# Patient Record
Sex: FEMALE | Race: WHITE | Employment: FULL TIME | URBAN - NONMETROPOLITAN AREA
[De-identification: names, ages, dates, MRNs, and addresses within clinical notes are randomized per-mention and may not be internally consistent; named-entity substitution may affect disease eponyms.]

---

## 2019-07-23 ENCOUNTER — HOSPITAL ENCOUNTER (INPATIENT)
Age: 54
LOS: 3 days | Discharge: HOME | DRG: 417 | End: 2019-07-26
Attending: INTERNAL MEDICINE | Admitting: INTERNAL MEDICINE
Payer: COMMERCIAL

## 2019-07-23 DIAGNOSIS — K80.50 CHOLEDOCHOLITHIASIS: Primary | ICD-10-CM

## 2019-07-23 LAB
ANION GAP SERPL CALCULATED.3IONS-SCNC: 15 MEQ/L (ref 8–16)
BUN BLDV-MCNC: 10 MG/DL (ref 7–22)
CALCIUM SERPL-MCNC: 9.4 MG/DL (ref 8.5–10.5)
CHLORIDE BLD-SCNC: 94 MEQ/L (ref 98–111)
CO2: 25 MEQ/L (ref 23–33)
CREAT SERPL-MCNC: 0.3 MG/DL (ref 0.4–1.2)
ERYTHROCYTE [DISTWIDTH] IN BLOOD BY AUTOMATED COUNT: 13.8 % (ref 11.5–14.5)
ERYTHROCYTE [DISTWIDTH] IN BLOOD BY AUTOMATED COUNT: 45.1 FL (ref 35–45)
GFR SERPL CREATININE-BSD FRML MDRD: > 90 ML/MIN/1.73M2
GLUCOSE BLD-MCNC: 110 MG/DL (ref 70–108)
HCT VFR BLD CALC: 37.5 % (ref 37–47)
HEMOGLOBIN: 12.7 GM/DL (ref 12–16)
INR BLD: 1.11 (ref 0.85–1.13)
MCH RBC QN AUTO: 30.4 PG (ref 26–33)
MCHC RBC AUTO-ENTMCNC: 33.9 GM/DL (ref 32.2–35.5)
MCV RBC AUTO: 89.7 FL (ref 81–99)
PLATELET # BLD: 254 THOU/MM3 (ref 130–400)
PMV BLD AUTO: 10.7 FL (ref 9.4–12.4)
POTASSIUM SERPL-SCNC: 4.1 MEQ/L (ref 3.5–5.2)
RBC # BLD: 4.18 MILL/MM3 (ref 4.2–5.4)
SODIUM BLD-SCNC: 134 MEQ/L (ref 135–145)
WBC # BLD: 10.9 THOU/MM3 (ref 4.8–10.8)

## 2019-07-23 PROCEDURE — 80048 BASIC METABOLIC PNL TOTAL CA: CPT

## 2019-07-23 PROCEDURE — 99223 1ST HOSP IP/OBS HIGH 75: CPT | Performed by: INTERNAL MEDICINE

## 2019-07-23 PROCEDURE — 36415 COLL VENOUS BLD VENIPUNCTURE: CPT

## 2019-07-23 PROCEDURE — 2709999900 HC NON-CHARGEABLE SUPPLY

## 2019-07-23 PROCEDURE — 2580000003 HC RX 258: Performed by: INTERNAL MEDICINE

## 2019-07-23 PROCEDURE — C9113 INJ PANTOPRAZOLE SODIUM, VIA: HCPCS | Performed by: INTERNAL MEDICINE

## 2019-07-23 PROCEDURE — 6360000002 HC RX W HCPCS: Performed by: INTERNAL MEDICINE

## 2019-07-23 PROCEDURE — 85027 COMPLETE CBC AUTOMATED: CPT

## 2019-07-23 PROCEDURE — 85610 PROTHROMBIN TIME: CPT

## 2019-07-23 PROCEDURE — 94760 N-INVAS EAR/PLS OXIMETRY 1: CPT

## 2019-07-23 PROCEDURE — 1200000000 HC SEMI PRIVATE

## 2019-07-23 RX ORDER — PANTOPRAZOLE SODIUM 40 MG/10ML
40 INJECTION, POWDER, LYOPHILIZED, FOR SOLUTION INTRAVENOUS DAILY
Status: DISCONTINUED | OUTPATIENT
Start: 2019-07-23 | End: 2019-07-26 | Stop reason: HOSPADM

## 2019-07-23 RX ORDER — ONDANSETRON 2 MG/ML
4 INJECTION INTRAMUSCULAR; INTRAVENOUS EVERY 6 HOURS PRN
Status: DISCONTINUED | OUTPATIENT
Start: 2019-07-23 | End: 2019-07-26 | Stop reason: HOSPADM

## 2019-07-23 RX ORDER — SODIUM CHLORIDE 9 MG/ML
INJECTION, SOLUTION INTRAVENOUS CONTINUOUS
Status: DISCONTINUED | OUTPATIENT
Start: 2019-07-23 | End: 2019-07-26 | Stop reason: HOSPADM

## 2019-07-23 RX ORDER — SODIUM CHLORIDE 0.9 % (FLUSH) 0.9 %
10 SYRINGE (ML) INJECTION EVERY 12 HOURS SCHEDULED
Status: DISCONTINUED | OUTPATIENT
Start: 2019-07-23 | End: 2019-07-26 | Stop reason: HOSPADM

## 2019-07-23 RX ORDER — LEVOTHYROXINE SODIUM 0.12 MG/1
125 TABLET ORAL DAILY
Status: DISCONTINUED | OUTPATIENT
Start: 2019-07-23 | End: 2019-07-26 | Stop reason: HOSPADM

## 2019-07-23 RX ORDER — SODIUM CHLORIDE 0.9 % (FLUSH) 0.9 %
10 SYRINGE (ML) INJECTION PRN
Status: DISCONTINUED | OUTPATIENT
Start: 2019-07-23 | End: 2019-07-26 | Stop reason: HOSPADM

## 2019-07-23 RX ORDER — 0.9 % SODIUM CHLORIDE 0.9 %
10 VIAL (ML) INJECTION PRN
Status: DISCONTINUED | OUTPATIENT
Start: 2019-07-23 | End: 2019-07-23 | Stop reason: SDUPTHER

## 2019-07-23 RX ORDER — SODIUM CHLORIDE 0.9 % (FLUSH) 0.9 %
10 SYRINGE (ML) INJECTION EVERY 12 HOURS SCHEDULED
Status: DISCONTINUED | OUTPATIENT
Start: 2019-07-23 | End: 2019-07-23 | Stop reason: SDUPTHER

## 2019-07-23 RX ORDER — FLUTICASONE PROPIONATE 50 MCG
1 SPRAY, SUSPENSION (ML) NASAL DAILY
Status: DISCONTINUED | OUTPATIENT
Start: 2019-07-23 | End: 2019-07-26 | Stop reason: HOSPADM

## 2019-07-23 RX ORDER — LEVOTHYROXINE SODIUM 0.12 MG/1
125 TABLET ORAL DAILY
COMMUNITY

## 2019-07-23 RX ADMIN — PIPERACILLIN SODIUM,TAZOBACTAM SODIUM 3.38 G: 3; .375 INJECTION, POWDER, FOR SOLUTION INTRAVENOUS at 23:46

## 2019-07-23 RX ADMIN — PANTOPRAZOLE SODIUM 40 MG: 40 INJECTION, POWDER, FOR SOLUTION INTRAVENOUS at 15:47

## 2019-07-23 RX ADMIN — PIPERACILLIN SODIUM,TAZOBACTAM SODIUM 3.38 G: 3; .375 INJECTION, POWDER, FOR SOLUTION INTRAVENOUS at 00:00

## 2019-07-23 RX ADMIN — SODIUM CHLORIDE: 9 INJECTION, SOLUTION INTRAVENOUS at 15:48

## 2019-07-23 ASSESSMENT — PAIN SCALES - GENERAL: PAINLEVEL_OUTOF10: 0

## 2019-07-23 NOTE — CONSULTS
Medication Sig Start Date End Date Taking? Authorizing Provider   levothyroxine (SYNTHROID) 125 MCG tablet Take 125 mcg by mouth Daily   Yes Historical Provider, MD   Esomeprazole Magnesium (NEXIUM 24HR PO) Take 1 tablet by mouth as needed   Yes Historical Provider, MD   Cetirizine HCl (ZYRTEC ALLERGY) 10 MG CAPS Take 1 tablet by mouth daily   Yes Historical Provider, MD   Fluticasone Propionate (FLONASE ALLERGY RELIEF NA) 2 puffs by Nasal route as needed   Yes Historical Provider, MD    Scheduled Meds:   piperacillin-tazobactam  3.375 g Intravenous Q8H    fluticasone  1 spray Nasal Daily    levothyroxine  125 mcg Oral Daily    pantoprazole  40 mg Intravenous Daily    sodium chloride flush  10 mL Intravenous 2 times per day     Continuous Infusions:   sodium chloride 75 mL/hr at 07/24/19 0351     PRN Meds:.sodium chloride flush, magnesium hydroxide, ondansetron  Allergies  is allergic to aspirin and ibuprofen. Family History  family history is not on file. Social History   reports that she has quit smoking. She has never used smokeless tobacco. She reports that she drank alcohol. She reports that she has current or past drug history. Review of Systems:  General Denies any fever or chills  HEENT Denies any diplopia, tinnitus or vertigo  Resp Denies any shortness of breath, cough or wheezing  Cardiac Denies any chest pain, palpitations, claudication or edema  GI Denies any melena, hematochezia, hematemesis or pyrosis   Denies any frequency, urgency, hesitancy or incontinence  Heme Denies bruising or bleeding easily  Endocrine Denies any history of diabetes or thyroid disease  Neuro Denies any focal motor or sensory deficits  OBJECTIVE:   CURRENT VITALS:  height is 5' 8\" (1.727 m) and weight is 158 lb 9.6 oz (71.9 kg). Her oral temperature is 98.8 °F (37.1 °C). Her blood pressure is 125/77 and her pulse is 85. Her respiration is 16 and oxygen saturation is 96%. Body mass index is 24.12 kg/m².   Temperature --   --  2.1   CALCIUM 9.4  --  9.0   INR  --  1.11  --    AST  --   --  72*   ALT  --   --  397*   BILITOT  --   --  2.9*   BILIDIR  --   --  1.5*   LIPASE  --   --  176.1*     RADIOLOGY:   I have personally reviewed the following films:  No orders to display       Thank you for the interesting evaluation. Further recommendations to follow.       Electronically signed by Daisy Escobar DO on 7/24/2019 at 7:42 AM

## 2019-07-23 NOTE — CONSULTS
Consult History & Physical      Patient:  Roxie Sykes  YOB: 1965    MRN: [id number]     Acct: [de-identified]      Chief Complaint:  Suspected choledocholithiasis, pancreatitis    Date of Service: Pt seen/examined in consultation on 7/23/2019    History Of Present Illness:      49 y.o. female who we are asked to see/evaluate by [address]* for medical management of Suspected choledocholithiasis,    [address] is a 52-year-old  female with underlying history of diabetes, hypothyroid and GERD who was transferred from [facility] due to pancreatitis secondary to  choledocholithiasis. She was scheduled for laparoscopic cholecystectomy as outpatient on 7/24/2019 but presented to the hospital on 7/21/2019 with increasing nausea, vomiting and abdominal pain. Admission labs demonstrated WBC 11.9, lipase 45,927, total bilirubin 6.1, direct bilirubin 4.6, alkaline phosphatase 290, , ALT 1406, amylase 2907 MRCP was completed with no visible calculus noted although other findings concerning for possible choledocholithiasis. She was treated with supportive care. Her lipase did trend down from 2800. She was treated with Invanz Surgery was consulted plan was for cholecystectomy on 7/24/2019. However, today total bilirubin increased to 7. 3.and direct bilirubin to 5.5, Other liver labs were trending down alkaline phosphatase 365, , , lipase 2796, direct bilirubin 5.5  Dr. Marquita Saldana recommended transferring patient to [facility] for ERCP prior to cholecystectomy. 7/21/2019 MRI/MRCP of the abdomen demonstrated marked hepatic steatosis. Moderate intra-and extrahepatic biliary ductal dilation. New from prior CT scan on 7/15/2019. Common duct now measures up to 1.5 cm. Cholelithiasis. Pancreatic duct is normal in caliber. Incidentally noted ansa pancreatica. New inflammatory changes surrounding the pancreas.   No visible choledocholithiasis, though punctuate calculus impacted at the ampulla could cause this appearance. 7/21/2019 right upper quadrant ultrasound demonstrates cholelithiasis with no sonographic evidence for acute cholecystitis. Dilated common bile duct measuring 1.3 cm is noted on comparison CT. Distal obstructive process cannot be excluded. Hepatic steatosis. On presentation today patient states abdominal pain has resolved. She denies any nausea or vomiting but admits she has not eaten in several days. She is passing gas and last bowel movement was a couple days ago. Patient denies any history of alcohol use or illicit drug use. She is never had pancreatitis before. No family history of pancreatic cancer. Patient has never had a colonoscopy. She denies any family history of colon cancer. She has underlying GERD but this is been well controlled of late. She denies dysphagia, odynophagia. Past Medical History:        Diagnosis Date    GERD (gastroesophageal reflux disease)     Hypothyroidism        Past Surgical History:        Procedure Laterality Date    CARPAL TUNNEL RELEASE Bilateral     SINUS SURGERY      x2    TUBAL LIGATION         Medications Prior to Admission:    Prior to Admission medications    Medication Sig Start Date End Date Taking? Authorizing Provider   levothyroxine (SYNTHROID) 125 MCG tablet Take 125 mcg by mouth Daily   Yes Historical Provider, MD   Esomeprazole Magnesium (NEXIUM 24HR PO) Take 1 tablet by mouth as needed   Yes Historical Provider, MD   Cetirizine HCl (ZYRTEC ALLERGY) 10 MG CAPS Take 1 tablet by mouth daily   Yes Historical Provider, MD   Fluticasone Propionate (FLONASE ALLERGY RELIEF NA) 2 puffs by Nasal route as needed   Yes Historical Provider, MD       Allergies:  Aspirin and Ibuprofen    Social History:      The patient currently lives Home    TOBACCO:   reports that she has quit smoking.  She has never used smokeless tobacco.  ETOH:   reports that she drank alcohol. Family History:      Reviewed in detail and negative for DM, CAD, Cancer, CVA. Positive as follows:    History reviewed. No pertinent family history. Diet:  Diet NPO Effective Now      Review Of Systems    GENERAL:  No fever, chills or weight loss. EYES:  No  blurred vision, double vision, glaucoma, pain on exposure to light. CARDIOVASCULAR:  No chest pain or palpitations. RESPIRATORY:  No dyspnea or cough. GI:  Refer to HPI  MUSCULOSKELETAL:  No new painful or swollen joints or myalgias. :   No dysuria or hematuria. SKIN:  No rashes or jaundice. NEUROLOGIC:   No headaches or  seizures,  numbness or tingling of arms, or legs. PSYCH:  No anxiety or depression. ENDOCRINE:   No polyuria or polydipsia. BLOOD:  No anemia, bleeding disorder, blood or blood product transfusion. PHYSICAL EXAM:  BP (!) 140/88   Pulse 86   Temp 98.9 °F (37.2 °C)   Resp 16   Ht 5' 8\" (1.727 m)   Wt 160 lb (72.6 kg)   BMI 24.33 kg/m²   General appearance: No apparent distress, appears stated age and cooperative. HEENT: Normal cephalic, atraumatic without obvious deformity. Pupils equal, round, and reactive to light. Sclera icteric   Neck: Supple, with full range of motion. No jugular venous distention. Trachea midline. Respiratory:  Normal respiratory effort. Clear to auscultation, bilaterally without Rales/Wheezes/Rhonchi. Cardiovascular: Regular rate and rhythm with normal S1/S2 without murmurs, rubs or gallops. Abdomen: Soft, non-tender, non-distended with normal bowel sounds. Musculoskeletal: No clubbing, cyanosis or edema bilaterally. Skin: Jaundice, texture, turgor normal.  No rashes or lesions. Neurologic:  Neurovascularly intact without any focal sensory/motor deficits.   Psychiatric: Alert and oriented, thought content appropriate, normal insight  Capillary Refill: Brisk,< 3 seconds   Peripheral Pulses: +2 palpable, equal bilaterally     Labs:     Recent Labs

## 2019-07-24 ENCOUNTER — APPOINTMENT (OUTPATIENT)
Dept: GENERAL RADIOLOGY | Age: 54
DRG: 417 | End: 2019-07-24
Attending: INTERNAL MEDICINE
Payer: COMMERCIAL

## 2019-07-24 LAB
ALBUMIN SERPL-MCNC: 3.4 G/DL (ref 3.5–5.1)
ALP BLD-CCNC: 290 U/L (ref 38–126)
ALT SERPL-CCNC: 397 U/L (ref 11–66)
ANION GAP SERPL CALCULATED.3IONS-SCNC: 15 MEQ/L (ref 8–16)
AST SERPL-CCNC: 72 U/L (ref 5–40)
BILIRUB SERPL-MCNC: 2.9 MG/DL (ref 0.3–1.2)
BILIRUBIN DIRECT: 1.5 MG/DL (ref 0–0.3)
BUN BLDV-MCNC: 10 MG/DL (ref 7–22)
CALCIUM SERPL-MCNC: 9 MG/DL (ref 8.5–10.5)
CHLORIDE BLD-SCNC: 99 MEQ/L (ref 98–111)
CO2: 22 MEQ/L (ref 23–33)
CREAT SERPL-MCNC: 0.4 MG/DL (ref 0.4–1.2)
GFR SERPL CREATININE-BSD FRML MDRD: > 90 ML/MIN/1.73M2
GLUCOSE BLD-MCNC: 113 MG/DL (ref 70–108)
LIPASE: 176.1 U/L (ref 5.6–51.3)
MAGNESIUM: 2.1 MG/DL (ref 1.6–2.4)
POTASSIUM REFLEX MAGNESIUM: 3.5 MEQ/L (ref 3.5–5.2)
SODIUM BLD-SCNC: 136 MEQ/L (ref 135–145)
TOTAL PROTEIN: 6.7 G/DL (ref 6.1–8)

## 2019-07-24 PROCEDURE — 2580000003 HC RX 258: Performed by: INTERNAL MEDICINE

## 2019-07-24 PROCEDURE — C1769 GUIDE WIRE: HCPCS | Performed by: INTERNAL MEDICINE

## 2019-07-24 PROCEDURE — 80076 HEPATIC FUNCTION PANEL: CPT

## 2019-07-24 PROCEDURE — 3609014900 HC ERCP W/SPHINCTEROTOMY &/OR PAPILLOTOMY: Performed by: INTERNAL MEDICINE

## 2019-07-24 PROCEDURE — 99153 MOD SED SAME PHYS/QHP EA: CPT | Performed by: INTERNAL MEDICINE

## 2019-07-24 PROCEDURE — 99152 MOD SED SAME PHYS/QHP 5/>YRS: CPT | Performed by: INTERNAL MEDICINE

## 2019-07-24 PROCEDURE — 83735 ASSAY OF MAGNESIUM: CPT

## 2019-07-24 PROCEDURE — 74328 X-RAY BILE DUCT ENDOSCOPY: CPT

## 2019-07-24 PROCEDURE — 1200000000 HC SEMI PRIVATE

## 2019-07-24 PROCEDURE — 6360000002 HC RX W HCPCS: Performed by: INTERNAL MEDICINE

## 2019-07-24 PROCEDURE — 0F778ZZ DILATION OF COMMON HEPATIC DUCT, VIA NATURAL OR ARTIFICIAL OPENING ENDOSCOPIC: ICD-10-PCS | Performed by: INTERNAL MEDICINE

## 2019-07-24 PROCEDURE — BF131ZZ FLUOROSCOPY OF GALLBLADDER AND BILE DUCTS USING LOW OSMOLAR CONTRAST: ICD-10-PCS | Performed by: INTERNAL MEDICINE

## 2019-07-24 PROCEDURE — 2580000003 HC RX 258: Performed by: NURSE PRACTITIONER

## 2019-07-24 PROCEDURE — 0F9C8ZZ DRAINAGE OF AMPULLA OF VATER, VIA NATURAL OR ARTIFICIAL OPENING ENDOSCOPIC: ICD-10-PCS | Performed by: INTERNAL MEDICINE

## 2019-07-24 PROCEDURE — 6370000000 HC RX 637 (ALT 250 FOR IP): Performed by: INTERNAL MEDICINE

## 2019-07-24 PROCEDURE — 83690 ASSAY OF LIPASE: CPT

## 2019-07-24 PROCEDURE — 80048 BASIC METABOLIC PNL TOTAL CA: CPT

## 2019-07-24 PROCEDURE — 99232 SBSQ HOSP IP/OBS MODERATE 35: CPT | Performed by: INTERNAL MEDICINE

## 2019-07-24 PROCEDURE — C9113 INJ PANTOPRAZOLE SODIUM, VIA: HCPCS | Performed by: INTERNAL MEDICINE

## 2019-07-24 PROCEDURE — 2720000010 HC SURG SUPPLY STERILE: Performed by: INTERNAL MEDICINE

## 2019-07-24 PROCEDURE — 99222 1ST HOSP IP/OBS MODERATE 55: CPT | Performed by: SURGERY

## 2019-07-24 PROCEDURE — 36415 COLL VENOUS BLD VENIPUNCTURE: CPT

## 2019-07-24 PROCEDURE — 2709999900 HC NON-CHARGEABLE SUPPLY: Performed by: INTERNAL MEDICINE

## 2019-07-24 PROCEDURE — 2500000003 HC RX 250 WO HCPCS

## 2019-07-24 RX ORDER — MIDAZOLAM HYDROCHLORIDE 1 MG/ML
INJECTION INTRAMUSCULAR; INTRAVENOUS PRN
Status: DISCONTINUED | OUTPATIENT
Start: 2019-07-24 | End: 2019-07-24 | Stop reason: ALTCHOICE

## 2019-07-24 RX ORDER — FENTANYL CITRATE 50 UG/ML
INJECTION, SOLUTION INTRAMUSCULAR; INTRAVENOUS PRN
Status: DISCONTINUED | OUTPATIENT
Start: 2019-07-24 | End: 2019-07-24 | Stop reason: ALTCHOICE

## 2019-07-24 RX ADMIN — Medication 10 ML: at 08:11

## 2019-07-24 RX ADMIN — PIPERACILLIN SODIUM,TAZOBACTAM SODIUM 3.38 G: 3; .375 INJECTION, POWDER, FOR SOLUTION INTRAVENOUS at 17:37

## 2019-07-24 RX ADMIN — SODIUM CHLORIDE: 9 INJECTION, SOLUTION INTRAVENOUS at 22:58

## 2019-07-24 RX ADMIN — PIPERACILLIN SODIUM,TAZOBACTAM SODIUM 3.38 G: 3; .375 INJECTION, POWDER, FOR SOLUTION INTRAVENOUS at 08:16

## 2019-07-24 RX ADMIN — PIPERACILLIN SODIUM,TAZOBACTAM SODIUM 3.38 G: 3; .375 INJECTION, POWDER, FOR SOLUTION INTRAVENOUS at 23:17

## 2019-07-24 RX ADMIN — PANTOPRAZOLE SODIUM 40 MG: 40 INJECTION, POWDER, FOR SOLUTION INTRAVENOUS at 08:11

## 2019-07-24 RX ADMIN — LEVOTHYROXINE SODIUM 125 MCG: 125 TABLET ORAL at 06:15

## 2019-07-24 RX ADMIN — SODIUM CHLORIDE: 9 INJECTION, SOLUTION INTRAVENOUS at 03:51

## 2019-07-24 ASSESSMENT — PAIN SCALES - GENERAL
PAINLEVEL_OUTOF10: 0
PAINLEVEL_OUTOF10: 2

## 2019-07-24 ASSESSMENT — PAIN DESCRIPTION - ONSET: ONSET: GRADUAL

## 2019-07-24 ASSESSMENT — PAIN DESCRIPTION - DIRECTION: RADIATING_TOWARDS: SIDE

## 2019-07-24 ASSESSMENT — PAIN DESCRIPTION - FREQUENCY: FREQUENCY: INTERMITTENT

## 2019-07-24 ASSESSMENT — PAIN - FUNCTIONAL ASSESSMENT
PAIN_FUNCTIONAL_ASSESSMENT: 0-10
PAIN_FUNCTIONAL_ASSESSMENT: ACTIVITIES ARE NOT PREVENTED

## 2019-07-24 ASSESSMENT — PAIN DESCRIPTION - LOCATION: LOCATION: ABDOMEN

## 2019-07-24 ASSESSMENT — PAIN DESCRIPTION - PROGRESSION: CLINICAL_PROGRESSION: GRADUALLY IMPROVING

## 2019-07-24 ASSESSMENT — PAIN DESCRIPTION - PAIN TYPE: TYPE: ACUTE PAIN

## 2019-07-24 ASSESSMENT — PAIN DESCRIPTION - ORIENTATION: ORIENTATION: LEFT

## 2019-07-24 ASSESSMENT — PAIN DESCRIPTION - DESCRIPTORS: DESCRIPTORS: ACHING

## 2019-07-24 NOTE — PLAN OF CARE
Nutrition Problem: Inadequate oral intake  Intervention: Food and/or Nutrient Delivery: (diet as per MD)  Nutritional Goals: Patient will receive adequate nutrition within 1-4 days.     Problem: Nutrition  Goal: Optimal nutrition therapy  Outcome: Ongoing

## 2019-07-25 ENCOUNTER — ANESTHESIA EVENT (OUTPATIENT)
Dept: OPERATING ROOM | Age: 54
DRG: 417 | End: 2019-07-25
Payer: COMMERCIAL

## 2019-07-25 ENCOUNTER — ANESTHESIA (OUTPATIENT)
Dept: OPERATING ROOM | Age: 54
DRG: 417 | End: 2019-07-25
Payer: COMMERCIAL

## 2019-07-25 VITALS
RESPIRATION RATE: 7 BRPM | DIASTOLIC BLOOD PRESSURE: 75 MMHG | SYSTOLIC BLOOD PRESSURE: 127 MMHG | OXYGEN SATURATION: 99 %

## 2019-07-25 LAB
ALBUMIN SERPL-MCNC: 3.6 G/DL (ref 3.5–5.1)
ALP BLD-CCNC: 539 U/L (ref 38–126)
ALT SERPL-CCNC: 373 U/L (ref 11–66)
AST SERPL-CCNC: 195 U/L (ref 5–40)
BILIRUB SERPL-MCNC: 4.8 MG/DL (ref 0.3–1.2)
BILIRUBIN DIRECT: 3.4 MG/DL (ref 0–0.3)
LIPASE: 234.7 U/L (ref 5.6–51.3)
TOTAL PROTEIN: 7.3 G/DL (ref 6.1–8)

## 2019-07-25 PROCEDURE — C9113 INJ PANTOPRAZOLE SODIUM, VIA: HCPCS | Performed by: INTERNAL MEDICINE

## 2019-07-25 PROCEDURE — 88304 TISSUE EXAM BY PATHOLOGIST: CPT

## 2019-07-25 PROCEDURE — 6360000002 HC RX W HCPCS: Performed by: SURGERY

## 2019-07-25 PROCEDURE — 2580000003 HC RX 258: Performed by: SURGERY

## 2019-07-25 PROCEDURE — 6360000002 HC RX W HCPCS: Performed by: NURSE ANESTHETIST, CERTIFIED REGISTERED

## 2019-07-25 PROCEDURE — 2580000003 HC RX 258: Performed by: INTERNAL MEDICINE

## 2019-07-25 PROCEDURE — 36415 COLL VENOUS BLD VENIPUNCTURE: CPT

## 2019-07-25 PROCEDURE — 99232 SBSQ HOSP IP/OBS MODERATE 35: CPT | Performed by: SURGERY

## 2019-07-25 PROCEDURE — 0FT44ZZ RESECTION OF GALLBLADDER, PERCUTANEOUS ENDOSCOPIC APPROACH: ICD-10-PCS | Performed by: SURGERY

## 2019-07-25 PROCEDURE — 99232 SBSQ HOSP IP/OBS MODERATE 35: CPT | Performed by: INTERNAL MEDICINE

## 2019-07-25 PROCEDURE — 2709999900 HC NON-CHARGEABLE SUPPLY

## 2019-07-25 PROCEDURE — C1894 INTRO/SHEATH, NON-LASER: HCPCS | Performed by: SURGERY

## 2019-07-25 PROCEDURE — 2500000003 HC RX 250 WO HCPCS: Performed by: SURGERY

## 2019-07-25 PROCEDURE — 3600000002 HC SURGERY LEVEL 2 BASE: Performed by: SURGERY

## 2019-07-25 PROCEDURE — 2500000003 HC RX 250 WO HCPCS: Performed by: NURSE ANESTHETIST, CERTIFIED REGISTERED

## 2019-07-25 PROCEDURE — 80076 HEPATIC FUNCTION PANEL: CPT

## 2019-07-25 PROCEDURE — 83690 ASSAY OF LIPASE: CPT

## 2019-07-25 PROCEDURE — 1200000000 HC SEMI PRIVATE

## 2019-07-25 PROCEDURE — 3700000000 HC ANESTHESIA ATTENDED CARE: Performed by: SURGERY

## 2019-07-25 PROCEDURE — 47562 LAPAROSCOPIC CHOLECYSTECTOMY: CPT | Performed by: SURGERY

## 2019-07-25 PROCEDURE — 7100000000 HC PACU RECOVERY - FIRST 15 MIN: Performed by: SURGERY

## 2019-07-25 PROCEDURE — 3700000001 HC ADD 15 MINUTES (ANESTHESIA): Performed by: SURGERY

## 2019-07-25 PROCEDURE — 3600000012 HC SURGERY LEVEL 2 ADDTL 15MIN: Performed by: SURGERY

## 2019-07-25 PROCEDURE — 6360000002 HC RX W HCPCS: Performed by: INTERNAL MEDICINE

## 2019-07-25 PROCEDURE — 7100000001 HC PACU RECOVERY - ADDTL 15 MIN: Performed by: SURGERY

## 2019-07-25 PROCEDURE — 2709999900 HC NON-CHARGEABLE SUPPLY: Performed by: SURGERY

## 2019-07-25 PROCEDURE — 6370000000 HC RX 637 (ALT 250 FOR IP): Performed by: INTERNAL MEDICINE

## 2019-07-25 RX ORDER — FENTANYL CITRATE 50 UG/ML
INJECTION, SOLUTION INTRAMUSCULAR; INTRAVENOUS PRN
Status: DISCONTINUED | OUTPATIENT
Start: 2019-07-25 | End: 2019-07-25 | Stop reason: SDUPTHER

## 2019-07-25 RX ORDER — SODIUM CHLORIDE 0.9 % (FLUSH) 0.9 %
10 SYRINGE (ML) INJECTION PRN
Status: DISCONTINUED | OUTPATIENT
Start: 2019-07-25 | End: 2019-07-25 | Stop reason: HOSPADM

## 2019-07-25 RX ORDER — NEOSTIGMINE METHYLSULFATE 5 MG/5 ML
SYRINGE (ML) INTRAVENOUS PRN
Status: DISCONTINUED | OUTPATIENT
Start: 2019-07-25 | End: 2019-07-25 | Stop reason: SDUPTHER

## 2019-07-25 RX ORDER — LABETALOL 20 MG/4 ML (5 MG/ML) INTRAVENOUS SYRINGE
10 EVERY 10 MIN PRN
Status: DISCONTINUED | OUTPATIENT
Start: 2019-07-25 | End: 2019-07-25 | Stop reason: HOSPADM

## 2019-07-25 RX ORDER — HYDROCODONE BITARTRATE AND ACETAMINOPHEN 5; 325 MG/1; MG/1
1 TABLET ORAL EVERY 4 HOURS PRN
Status: DISCONTINUED | OUTPATIENT
Start: 2019-07-25 | End: 2019-07-26 | Stop reason: HOSPADM

## 2019-07-25 RX ORDER — LIDOCAINE HCL/PF 100 MG/5ML
SYRINGE (ML) INJECTION PRN
Status: DISCONTINUED | OUTPATIENT
Start: 2019-07-25 | End: 2019-07-25 | Stop reason: SDUPTHER

## 2019-07-25 RX ORDER — BUPIVACAINE HYDROCHLORIDE 5 MG/ML
INJECTION, SOLUTION PERINEURAL PRN
Status: DISCONTINUED | OUTPATIENT
Start: 2019-07-25 | End: 2019-07-25 | Stop reason: HOSPADM

## 2019-07-25 RX ORDER — PROPOFOL 10 MG/ML
INJECTION, EMULSION INTRAVENOUS PRN
Status: DISCONTINUED | OUTPATIENT
Start: 2019-07-25 | End: 2019-07-25 | Stop reason: SDUPTHER

## 2019-07-25 RX ORDER — FENTANYL CITRATE 50 UG/ML
50 INJECTION, SOLUTION INTRAMUSCULAR; INTRAVENOUS EVERY 5 MIN PRN
Status: DISCONTINUED | OUTPATIENT
Start: 2019-07-25 | End: 2019-07-25 | Stop reason: HOSPADM

## 2019-07-25 RX ORDER — ONDANSETRON 2 MG/ML
INJECTION INTRAMUSCULAR; INTRAVENOUS PRN
Status: DISCONTINUED | OUTPATIENT
Start: 2019-07-25 | End: 2019-07-25 | Stop reason: SDUPTHER

## 2019-07-25 RX ORDER — PROMETHAZINE HYDROCHLORIDE 25 MG/ML
INJECTION, SOLUTION INTRAMUSCULAR; INTRAVENOUS
Status: DISPENSED
Start: 2019-07-25 | End: 2019-07-26

## 2019-07-25 RX ORDER — MIDAZOLAM HYDROCHLORIDE 1 MG/ML
INJECTION INTRAMUSCULAR; INTRAVENOUS PRN
Status: DISCONTINUED | OUTPATIENT
Start: 2019-07-25 | End: 2019-07-25 | Stop reason: SDUPTHER

## 2019-07-25 RX ORDER — MORPHINE SULFATE 2 MG/ML
2 INJECTION, SOLUTION INTRAMUSCULAR; INTRAVENOUS EVERY 5 MIN PRN
Status: DISCONTINUED | OUTPATIENT
Start: 2019-07-25 | End: 2019-07-25 | Stop reason: HOSPADM

## 2019-07-25 RX ORDER — SODIUM CHLORIDE 9 MG/ML
INJECTION, SOLUTION INTRAVENOUS CONTINUOUS
Status: DISCONTINUED | OUTPATIENT
Start: 2019-07-25 | End: 2019-07-25

## 2019-07-25 RX ORDER — DEXAMETHASONE SODIUM PHOSPHATE 4 MG/ML
INJECTION, SOLUTION INTRA-ARTICULAR; INTRALESIONAL; INTRAMUSCULAR; INTRAVENOUS; SOFT TISSUE PRN
Status: DISCONTINUED | OUTPATIENT
Start: 2019-07-25 | End: 2019-07-25 | Stop reason: SDUPTHER

## 2019-07-25 RX ORDER — SODIUM CHLORIDE 0.9 % (FLUSH) 0.9 %
10 SYRINGE (ML) INJECTION EVERY 12 HOURS SCHEDULED
Status: DISCONTINUED | OUTPATIENT
Start: 2019-07-25 | End: 2019-07-25 | Stop reason: HOSPADM

## 2019-07-25 RX ORDER — ESMOLOL HYDROCHLORIDE 10 MG/ML
INJECTION INTRAVENOUS PRN
Status: DISCONTINUED | OUTPATIENT
Start: 2019-07-25 | End: 2019-07-25 | Stop reason: SDUPTHER

## 2019-07-25 RX ORDER — GLYCOPYRROLATE 1 MG/5 ML
SYRINGE (ML) INTRAVENOUS PRN
Status: DISCONTINUED | OUTPATIENT
Start: 2019-07-25 | End: 2019-07-25 | Stop reason: SDUPTHER

## 2019-07-25 RX ORDER — ROCURONIUM BROMIDE 10 MG/ML
INJECTION, SOLUTION INTRAVENOUS PRN
Status: DISCONTINUED | OUTPATIENT
Start: 2019-07-25 | End: 2019-07-25 | Stop reason: SDUPTHER

## 2019-07-25 RX ADMIN — PROPOFOL 200 MG: 10 INJECTION, EMULSION INTRAVENOUS at 12:06

## 2019-07-25 RX ADMIN — PANTOPRAZOLE SODIUM 40 MG: 40 INJECTION, POWDER, FOR SOLUTION INTRAVENOUS at 09:18

## 2019-07-25 RX ADMIN — FENTANYL CITRATE 50 MCG: 50 INJECTION INTRAMUSCULAR; INTRAVENOUS at 12:34

## 2019-07-25 RX ADMIN — Medication 100 MG: at 12:06

## 2019-07-25 RX ADMIN — ROCURONIUM BROMIDE 30 MG: 10 INJECTION INTRAVENOUS at 12:06

## 2019-07-25 RX ADMIN — PIPERACILLIN SODIUM,TAZOBACTAM SODIUM 3.38 G: 3; .375 INJECTION, POWDER, FOR SOLUTION INTRAVENOUS at 08:13

## 2019-07-25 RX ADMIN — SODIUM CHLORIDE: 9 INJECTION, SOLUTION INTRAVENOUS at 14:00

## 2019-07-25 RX ADMIN — FENTANYL CITRATE 100 MCG: 50 INJECTION INTRAMUSCULAR; INTRAVENOUS at 12:06

## 2019-07-25 RX ADMIN — LEVOTHYROXINE SODIUM 125 MCG: 125 TABLET ORAL at 06:19

## 2019-07-25 RX ADMIN — SODIUM CHLORIDE: 9 INJECTION, SOLUTION INTRAVENOUS at 11:17

## 2019-07-25 RX ADMIN — FENTANYL CITRATE 25 MCG: 50 INJECTION INTRAMUSCULAR; INTRAVENOUS at 13:00

## 2019-07-25 RX ADMIN — Medication 0.4 MG: at 13:00

## 2019-07-25 RX ADMIN — ESMOLOL HYDROCHLORIDE 25 MG: 10 INJECTION, SOLUTION INTRAVENOUS at 12:35

## 2019-07-25 RX ADMIN — CEFOXITIN SODIUM 2 G: 2 POWDER, FOR SOLUTION INTRAVENOUS at 12:08

## 2019-07-25 RX ADMIN — ESMOLOL HYDROCHLORIDE 50 MG: 10 INJECTION, SOLUTION INTRAVENOUS at 12:22

## 2019-07-25 RX ADMIN — DEXAMETHASONE SODIUM PHOSPHATE 10 MG: 4 INJECTION, SOLUTION INTRAMUSCULAR; INTRAVENOUS at 12:10

## 2019-07-25 RX ADMIN — Medication 10 ML: at 09:18

## 2019-07-25 RX ADMIN — HYDROMORPHONE HYDROCHLORIDE 0.5 MG: 1 INJECTION, SOLUTION INTRAMUSCULAR; INTRAVENOUS; SUBCUTANEOUS at 15:00

## 2019-07-25 RX ADMIN — ONDANSETRON HYDROCHLORIDE 4 MG: 4 INJECTION, SOLUTION INTRAMUSCULAR; INTRAVENOUS at 12:10

## 2019-07-25 RX ADMIN — ESMOLOL HYDROCHLORIDE 25 MG: 10 INJECTION, SOLUTION INTRAVENOUS at 12:50

## 2019-07-25 RX ADMIN — Medication 2 MG: at 13:00

## 2019-07-25 RX ADMIN — PROPOFOL 75 MG: 10 INJECTION, EMULSION INTRAVENOUS at 12:50

## 2019-07-25 RX ADMIN — FENTANYL CITRATE 25 MCG: 50 INJECTION INTRAMUSCULAR; INTRAVENOUS at 13:05

## 2019-07-25 RX ADMIN — MIDAZOLAM HYDROCHLORIDE 2 MG: 1 INJECTION, SOLUTION INTRAMUSCULAR; INTRAVENOUS at 12:04

## 2019-07-25 RX ADMIN — PIPERACILLIN SODIUM,TAZOBACTAM SODIUM 3.38 G: 3; .375 INJECTION, POWDER, FOR SOLUTION INTRAVENOUS at 16:34

## 2019-07-25 ASSESSMENT — PAIN DESCRIPTION - ONSET: ONSET: ON-GOING

## 2019-07-25 ASSESSMENT — PULMONARY FUNCTION TESTS
PIF_VALUE: 23
PIF_VALUE: 16
PIF_VALUE: 22
PIF_VALUE: 16
PIF_VALUE: 14
PIF_VALUE: 14
PIF_VALUE: 17
PIF_VALUE: 17
PIF_VALUE: 21
PIF_VALUE: 6
PIF_VALUE: 22
PIF_VALUE: 22
PIF_VALUE: 0
PIF_VALUE: 23
PIF_VALUE: 17
PIF_VALUE: 17
PIF_VALUE: 19
PIF_VALUE: 21
PIF_VALUE: 16
PIF_VALUE: 22
PIF_VALUE: 21
PIF_VALUE: 21
PIF_VALUE: 17
PIF_VALUE: 16
PIF_VALUE: 21
PIF_VALUE: 12
PIF_VALUE: 17
PIF_VALUE: 7
PIF_VALUE: 2
PIF_VALUE: 16
PIF_VALUE: 18
PIF_VALUE: 15
PIF_VALUE: 21
PIF_VALUE: 7
PIF_VALUE: 18
PIF_VALUE: 21
PIF_VALUE: 11
PIF_VALUE: 22
PIF_VALUE: 23
PIF_VALUE: 22
PIF_VALUE: 1
PIF_VALUE: 23
PIF_VALUE: 17
PIF_VALUE: 21
PIF_VALUE: 18
PIF_VALUE: 22
PIF_VALUE: 22
PIF_VALUE: 23
PIF_VALUE: 21
PIF_VALUE: 7
PIF_VALUE: 30
PIF_VALUE: 22
PIF_VALUE: 1
PIF_VALUE: 0
PIF_VALUE: 23
PIF_VALUE: 23
PIF_VALUE: 0
PIF_VALUE: 21
PIF_VALUE: 24
PIF_VALUE: 6
PIF_VALUE: 0
PIF_VALUE: 22
PIF_VALUE: 18
PIF_VALUE: 21
PIF_VALUE: 17
PIF_VALUE: 21
PIF_VALUE: 20
PIF_VALUE: 23
PIF_VALUE: 21
PIF_VALUE: 22
PIF_VALUE: 22
PIF_VALUE: 17
PIF_VALUE: 18

## 2019-07-25 ASSESSMENT — PAIN DESCRIPTION - FREQUENCY: FREQUENCY: CONTINUOUS

## 2019-07-25 ASSESSMENT — PAIN SCALES - GENERAL
PAINLEVEL_OUTOF10: 0
PAINLEVEL_OUTOF10: 7
PAINLEVEL_OUTOF10: 7
PAINLEVEL_OUTOF10: 3
PAINLEVEL_OUTOF10: 7
PAINLEVEL_OUTOF10: 3

## 2019-07-25 ASSESSMENT — PAIN - FUNCTIONAL ASSESSMENT: PAIN_FUNCTIONAL_ASSESSMENT: ACTIVITIES ARE NOT PREVENTED

## 2019-07-25 ASSESSMENT — PAIN DESCRIPTION - DIRECTION: RADIATING_TOWARDS: BACK

## 2019-07-25 ASSESSMENT — PAIN DESCRIPTION - PAIN TYPE: TYPE: ACUTE PAIN;SURGICAL PAIN

## 2019-07-25 ASSESSMENT — PAIN DESCRIPTION - ORIENTATION: ORIENTATION: MID

## 2019-07-25 ASSESSMENT — PAIN DESCRIPTION - LOCATION: LOCATION: ABDOMEN

## 2019-07-25 ASSESSMENT — PAIN DESCRIPTION - PROGRESSION: CLINICAL_PROGRESSION: NOT CHANGED

## 2019-07-25 ASSESSMENT — PAIN DESCRIPTION - DESCRIPTORS: DESCRIPTORS: DISCOMFORT;PRESSURE

## 2019-07-25 NOTE — PLAN OF CARE
Problem: Falls - Risk of:  Goal: Will remain free from falls  Description  Will remain free from falls  7/25/2019 0007 by Sumaya Hunter RN  Outcome: Ongoing  Note:   Patient free from falls this shift. Gait steady with no assist. Alert and oriented x 4, using call light appropriately. Problem: Pain Control  Goal: Maintain pain level at or below patient's acceptable level (or 5 if patient is unable to determine acceptable level)  7/25/2019 0007 by Sumaya Hunter RN  Outcome: Ongoing  Flowsheets (Taken 7/24/2019 0809 by Marion Lerner RN)  Patient's Stated Pain Goal: 3  Note:   Patient denies pain at this time. Meeting pain goal of 3/10 with no interventions. Problem: Cardiovascular  Goal: No DVT, peripheral vascular complications  Outcome: Ongoing  Note:   Patient free from signs of dvt this shift. No pain, warmth or redness to calves. Scd's in use. Problem: GI  Goal: No bowel complications  Outcome: Ongoing  Note:   Abdomen soft/tender, bowel sounds active. Patient is passing gas, no bowel movement this shift. Denies nausea. Problem: Discharge Planning:  Goal: Patients continuum of care needs are met  Description  Patients continuum of care needs are met  7/25/2019 0007 by Sumaya Hunter RN  Outcome: Ongoing  Note:   Patient anticipates being discharged to home with family, denies needs at this time. Problem: Nutrition  Goal: Optimal nutrition therapy  7/25/2019 0007 by Sumaya Hunter RN  Outcome: Ongoing  Note:   Patient npo at this time. Care plan reviewed with patient. Patient verbalized understanding of the plan of care and contribute to goal setting.

## 2019-07-25 NOTE — PROCEDURES
[address] [place] [address]                                 PROCEDURE NOTE    PATIENT NAME: Shalonda Hunter                       :        1965  MED REC NO:   [id number]                           ROOM:       8648  ACCOUNT NO:   [de-identified]                           ADMIT DATE: 2019  PROVIDER:     Shoshana Jin M.D.    Khurram Mayo:  2019    ERCP REPORT    PROCEDURE TYPE:  ERCP. SURGEON:  Donavan Samuels MD    INSTRUMENT:  Olympus therapeutic video duodenoscope. MEDICATIONS:  The patient is on IV antibiotics (Zosyn). Also for  sedation, she received fentanyl 300 mcg, Versed totaling 18 mg in order  to achieve and maintain sedation (initial dose to achieve initial  sedation was fentanyl 200 mcg and Versed 13 mg). Also, the patient did  receive glucagon 0.5 mg. BRIEF HISTORY:  The patient is a 67-year-old with a history of acute  gallstone pancreatitis. MRCP suggested choledocholithiasis. Due to  suspected common bile duct stones, ERCP was planned (prior to  anticipated laparoscopic cholecystectomy). The rationale for and risks  of the procedure were discussed in detail with the patient who expressed  understanding and did wish to proceed. She was informed that risks  included, but were not limited to adverse medication reaction, bleeding,  perforation, infection, and worsening of her pancreatitis. DESCRIPTION OF PROCEDURE:  The patient was brought to the endoscopy  department. She was placed on the appropriate monitoring devices. She  was placed prone on the fluoroscopy table. Fentanyl and Versed were  given in order to achieve and maintain conscious sedation. After  achieving initial sedation (which took quite a large amount of  medication), the Olympus therapeutic video duodenoscope was advanced  gently across the cricopharyngeal musculature into the esophagus.   The  scope was advanced roughly 13 mm or so. The cystic duct was patent. The  gallbladder filled. The cholangiogram otherwise unremarkable. No  pancreatic duct injections were made. Subsequently, after final  occlusion cholangiogram, the catheter and guidewire removed. The scope  was brought back into the stomach. Air and secretions were suctioned  out and the scope was withdrawn. The patient tolerated the procedure  well. No apparent complications. PHOTOGRAPHS:  #1 and #2 show the major papilla. There was a  periampullary diverticulum incidentally noted. #3 shows how long the  intraduodenal segment of the bile duct was, also seen in photograph #4  after cannulation with the 5-4-3 catheter. Photograph #5 shows  cannulation with the catheter. #6 is during endoscopic sphincterotomy. #7 is guidewire; #8 and #9, after balloon sweeps. #10 does not appear  to show anything significant. This may have been taken inadvertently  after the case. IMPRESSION:  1. Focal mucosal erythema involving the papillary orifice, incidentally  noted at the initial inspection. Endoscopic appearance suspicious for  passage of common bile duct stone. Endoscopic appearance benign. 2.  Very long intraduodenal segment of the distal bile duct as outlined  above. This did make free cannulation of the bile duct technically more  difficult. 3.  Dilated bile duct with a maximal diameter about 1.3 cm or so. No  stone seen. 4.  Endoscopic sphincterotomy performed and 12 mm balloon swept several  times through the duct. No stones. Final occlusion cholangiogram was  clear. 5.  Cystic duct patent and gallbladder fills. 6.  Otherwise unremarkable. ASSESSMENT:  As described above, ERCP with endoscopic sphincterotomy was  performed. No stones were seen. I suspect that the stone passed. The  bile duct is currently clear. Endoscopic sphincterotomy was performed  and 12 mm balloon passes out very easily. PLAN:  1. Strict n.p.o. today.   2. Continue IV fluids and antibiotics. 3.  Further plans based on upcoming clinical course. 4.  The patient was not given Indocin suppositories after the procedure  due to her report of allergic reaction to NONSTEROIDAL ANTI-INFLAMMATORY  DRUGS (respiratory). 5.  Further plans based on upcoming clinical course. We look forward to  following with you. Salma Phan M.D.    D: 07/24/2019 17:14:29       T: 07/24/2019 21:04:49     [email or web address]  Job#: [id number]     Doc#: [id number]    CC:  DO Ross Herron [facility].  Sophia Drew M.D.

## 2019-07-25 NOTE — ANESTHESIA PRE PROCEDURE
ASA 2     (Past history of smoking , stopped smoiking 2 months ago)  Induction: intravenous. MIPS: Postoperative opioids intended and Prophylactic antiemetics administered. Anesthetic plan and risks discussed with patient. Plan discussed with CRNA.                   Lizzeth Avina MD   7/25/2019

## 2019-07-25 NOTE — PLAN OF CARE
Problem: Falls - Risk of:  Goal: Will remain free from falls  Description  Will remain free from falls  Outcome: Met This Shift  Note:   No falls noted this shift. Patient ambulates with x1 staff assistance without difficulty. Bed kept in low position. Safe environment maintained. Bedside table & call light in reach. Uses call light appropriately when needing assistance. Fall band and sign posted for safety     Problem: Cardiovascular  Goal: No DVT, peripheral vascular complications  Outcome: Met This Shift  Note:   No chest pain, shortness of breath or edema noted. Pcds and lovenox added for dvt prophylaxis     Problem:   Goal: Adequate urinary output  Outcome: Met This Shift  Note:   Voiding yellow urine greater than 30ml/hr     Problem: Pain Control  Goal: Maintain pain level at or below patient's acceptable level (or 5 if patient is unable to determine acceptable level)  Outcome: Ongoing  Flowsheets (Taken 7/25/2019 2807)  Patient's Stated Pain Goal: 1  Note:   Post-op abdominal pain of 7-8/10. May have dilaudid or norco for pain as needed. Pt able to rest with eyes close after pain medication. Pain goal not met as yet     Problem: GI  Goal: No bowel complications  Outcome: Ongoing  Note:   Abdomen soft, hypoactive bowel sounds, no gas or bm as yet. Problem: Discharge Planning:  Goal: Patients continuum of care needs are met  Description  Patients continuum of care needs are met  Outcome: Ongoing  Note:   Plans to return home with family at discharge     Problem: Nutrition  Goal: Optimal nutrition therapy  Outcome: Ongoing  Note:   Placed on clear liquids and advance as tolerated. Taking sips of liquids     Problem: Skin Integrity/Risk  Goal: No skin breakdown during hospitalization  Outcome: Ongoing  Note:   4 abdominal surgical sites with bandaid and steri strips all dry and intact. No redness, edema or drainage noted.     Care plan reviewed with patient   Patient  verbalize understanding of the plan of care and contribute to goal setting.

## 2019-07-25 NOTE — FLOWSHEET NOTE
07/25/19 7544   Encounter Summary   Services provided to: Patient   Referral/Consult From: [facility]   Continue Visiting Yes  (7/25)   Complexity of Encounter Moderate   Length of Encounter 15 minutes   Spiritual Assessment Completed Yes   Advance Care Planning Yes   Grief and Life Adjustment   Type New Diagnosis   Assessment Approachable   Intervention Nurtured hope;Explored feelings, thoughts, concerns; Discussed illness/injury and it's impact   Outcome Receptive;Encouraged;Expressed gratitude;Engaged in conversation     Assessment: The patient is a 49 yr old female who came in for gallbladder surgery. She was transferred to [facility] after spending two previous days in an [facility] hospital. She will be having the surgery later this afternoon. Her four sons are local to her community but not able to get here at this time. She does have a large josh community who has been supportive of her through prayer. She also was very excited as she spoke of her grandchildren which led to our conversation about what she could and couldn't live with in her life. Her health is critical to her at this time and she doesn't see that changing. Patient appears in good spirits considering her anxiety over the surgery. Plan: Continued support would benefit her since none of her people are local to this area.

## 2019-07-26 VITALS
OXYGEN SATURATION: 96 % | WEIGHT: 157.2 LBS | BODY MASS INDEX: 23.82 KG/M2 | TEMPERATURE: 97.8 F | SYSTOLIC BLOOD PRESSURE: 133 MMHG | DIASTOLIC BLOOD PRESSURE: 72 MMHG | HEART RATE: 72 BPM | RESPIRATION RATE: 16 BRPM | HEIGHT: 68 IN

## 2019-07-26 LAB
ALBUMIN SERPL-MCNC: 3.5 G/DL (ref 3.5–5.1)
ALP BLD-CCNC: 398 U/L (ref 38–126)
ALT SERPL-CCNC: 279 U/L (ref 11–66)
AST SERPL-CCNC: 105 U/L (ref 5–40)
BILIRUB SERPL-MCNC: 2.1 MG/DL (ref 0.3–1.2)
BILIRUBIN DIRECT: 1.1 MG/DL (ref 0–0.3)
LIPASE: 177.6 U/L (ref 5.6–51.3)
TOTAL PROTEIN: 6.9 G/DL (ref 6.1–8)

## 2019-07-26 PROCEDURE — 80076 HEPATIC FUNCTION PANEL: CPT

## 2019-07-26 PROCEDURE — 6360000002 HC RX W HCPCS: Performed by: SURGERY

## 2019-07-26 PROCEDURE — 99024 POSTOP FOLLOW-UP VISIT: CPT | Performed by: SURGERY

## 2019-07-26 PROCEDURE — C9113 INJ PANTOPRAZOLE SODIUM, VIA: HCPCS | Performed by: SURGERY

## 2019-07-26 PROCEDURE — 2580000003 HC RX 258: Performed by: SURGERY

## 2019-07-26 PROCEDURE — 6370000000 HC RX 637 (ALT 250 FOR IP): Performed by: SURGERY

## 2019-07-26 PROCEDURE — 99239 HOSP IP/OBS DSCHRG MGMT >30: CPT | Performed by: INTERNAL MEDICINE

## 2019-07-26 PROCEDURE — 36415 COLL VENOUS BLD VENIPUNCTURE: CPT

## 2019-07-26 PROCEDURE — 83690 ASSAY OF LIPASE: CPT

## 2019-07-26 RX ORDER — HYDROCODONE BITARTRATE AND ACETAMINOPHEN 5; 325 MG/1; MG/1
1 TABLET ORAL EVERY 4 HOURS PRN
Qty: 32 TABLET | Refills: 0 | Status: SHIPPED | OUTPATIENT
Start: 2019-07-26 | End: 2019-08-02

## 2019-07-26 RX ADMIN — LEVOTHYROXINE SODIUM 125 MCG: 125 TABLET ORAL at 06:13

## 2019-07-26 RX ADMIN — PIPERACILLIN SODIUM,TAZOBACTAM SODIUM 3.38 G: 3; .375 INJECTION, POWDER, FOR SOLUTION INTRAVENOUS at 00:42

## 2019-07-26 RX ADMIN — PANTOPRAZOLE SODIUM 40 MG: 40 INJECTION, POWDER, FOR SOLUTION INTRAVENOUS at 10:44

## 2019-07-26 RX ADMIN — SODIUM CHLORIDE: 9 INJECTION, SOLUTION INTRAVENOUS at 00:28

## 2019-07-26 RX ADMIN — ENOXAPARIN SODIUM 40 MG: 40 INJECTION SUBCUTANEOUS at 10:52

## 2019-07-26 RX ADMIN — Medication 10 ML: at 10:44

## 2019-07-26 ASSESSMENT — PAIN SCALES - GENERAL: PAINLEVEL_OUTOF10: 0

## 2019-07-26 NOTE — OP NOTE
which truly was the end of the  gallbladder with this duct going into it. Two clips were then placed on  the duct right at the neck of the gallbladder and was incised on the  gallbladder side and appeared to be only of one lumen. There was a  small amount of leakage of bile from this area and an Endoloop was then  placed around it securing it with no additional bile leak. The abdomen  was then copiously irrigated. Extensive irrigation fluid removed with  suction. All ports and instruments removed from the patient's abdomen  and pneumoperitoneum was reduced. Fascial defects approximated using 0  Vicryl suture. Skin edges were then infiltrated with local anesthetic. Skin closed with using 4-0 Vicryl suture in a combination of single  interrupted running subcuticular fashion. Wounds then cleansed,  prepared. Steri-Strips and sterile dressings were applied. The patient  brought out of anesthesia, transferred to PACU in stable and  satisfactory condition with no immediate complication evident. All  sponge, instrument, and needle counts were correct at the completion of  the procedure. Postoperative findings were unable to discuss with the patient's family  as none were present. She will be admitted to the hospital for  postoperative management, discharge planning and progression.         Serge Kelly D.O.    D: 07/25/2019 14:14:16       T: 07/25/2019 23:15:45     [email or web address]  Job#: [id number]     Doc#: [id number]    CC:

## 2019-07-26 NOTE — DISCHARGE SUMMARY
reviewed and negative. PHYSICAL EXAM:  /72   Pulse 72   Temp 97.8 °F (36.6 °C) (Oral)   Resp 16   Ht 5' 8\" (1.727 m)   Wt 157 lb 3.2 oz (71.3 kg)   SpO2 96%   BMI 23.90 kg/m²   General appearance: No apparent distress, appears stated age and cooperative. HEENT: Normal cephalic, atraumatic without obvious deformity. Pupils equal, round, and reactive to light. Extra ocular muscles intact. Conjunctivae mildly icteric  Neck: Supple, with full range of motion. No jugular venous distention. Trachea midline. Respiratory:  Normal respiratory effort. Clear to auscultation, bilaterally without Rales/Wheezes/Rhonchi. Cardiovascular: Regular rate and rhythm with normal S1/S2 without murmurs, rubs or gallops. Abdomen: Soft, surgical wound, non-distended with normal bowel sounds. Musculoskeletal:  No clubbing, cyanosis or edema bilaterally. Skin: Skin color, texture, turgor normal.  No rashes or lesions. Neurologic:  Neurovascularly intact without any focal sensory/motor deficits. Cranial nerves: II-XII intact, grossly non-focal.  Psychiatric: Alert and oriented, thought content appropriate, normal insight  Capillary Refill: Brisk,< 3 seconds   Peripheral Pulses: +2 palpable, equal bilaterally     Labs:     Recent Labs     07/23/19  1453   WBC 10.9*   HGB 12.7   HCT 37.5        Recent Labs     07/23/19  1453 07/24/19  0445   * 136   K 4.1 3.5   CL 94* 99   CO2 25 22*   BUN 10 10   CREATININE 0.3* 0.4   CALCIUM 9.4 9.0     Recent Labs     07/24/19  0445 07/25/19  0443 07/26/19  0414   AST 72* 195* 105*   * 373* 279*   BILIDIR 1.5* 3.4* 1.1*   BILITOT 2.9* 4.8* 2.1*   ALKPHOS 290* 539* 398*     Recent Labs     07/23/19  1806   INR 1.11     No results for input(s): Teena Mcfadden in the last 72 hours. Urinalysis:  No results found for: Javier Murillo, BACTERIA, RBCUA, BLOODU, [address], [address]      Radiology:   [place] ERCP BILIARY S&I   Final Result   Mild biliary ductal dilatation.

## 2019-07-26 NOTE — CARE COORDINATION
7/26/19, 12:12 PM  [facility] day: 3  Location: [address] [place] [address]-A Reason for admit: Choledocholithiasis [K80.50]     Clinical update: S/P lap minna 7/25. , , alk phos 398, bili 2.1. Diet advanced, IV fluids decreased, progressive ambulation. Discharge plan: Plan is for possible home today. No discharge needs identified. Discharge plan discussed by  and . Discharge plan reviewed with patient/ family. Patient/ family verbalize understanding of discharge plan and are in agreement with plan. Understanding was demonstrated using the teach back method.

## 2019-07-26 NOTE — PLAN OF CARE
Problem: Falls - Risk of:  Goal: Will remain free from falls  Description  Will remain free from falls  7/26/2019 0144 by Adelfo Pérez RN  Outcome: Ongoing  Note:   Patient free from falls this shift. Gait steady with 1 assist. Alert and oriented x 4, using call light appropriately. Problem: Pain Control  Goal: Maintain pain level at or below patient's acceptable level (or 5 if patient is unable to determine acceptable level)  7/26/2019 0144 by Adelfo Pérez RN  Outcome: Ongoing  Flowsheets (Taken 7/25/2019 1527 by Irene Diego RN)  Patient's Stated Pain Goal: 1  Note:   Patient rating abdominal surgical pain 1/10. Controlled to pain goal of 1/10 with ice pack      Problem: Cardiovascular  Goal: No DVT, peripheral vascular complications  6/54/3250 0144 by Adelfo Pérez RN  Outcome: Ongoing  Note:   Patient free from signs of dvt this shift. No pain, warmth or redness to calves. Scd's in use. Problem: GI  Goal: No bowel complications  9/60/4717 0144 by Adelfo Pérez RN  Outcome: Ongoing  Note:   Abdomen soft/tender, bowel sounds active. Patient is not passing gas, no bowel movement this shift. Denies nausea. Problem: Discharge Planning:  Goal: Patients continuum of care needs are met  Description  Patients continuum of care needs are met  7/26/2019 0144 by Adelfo Pérez RN  Outcome: Ongoing  Note:   Patient anticipates being discharged to home with family, denies needs at this time. Problem: Nutrition  Goal: Optimal nutrition therapy  7/26/2019 0144 by Adelfo Pérez RN  Outcome: Ongoing  Note:   Patient tolerating full liquid diet     Problem: Skin Integrity/Risk  Goal: No skin breakdown during hospitalization  7/26/2019 0144 by Adelfo Pérez RN  Outcome: Ongoing  Note:   4 abdominal surgical sites with steri strips and bandaid to one site. Old drainage   Care plan reviewed with patient . Patient verbalized understanding of the plan of care and contribute to goal setting.

## 2019-07-26 NOTE — PROGRESS NOTES
1655: Received to PACU post-ERCP. Patient fully awake. Denies pain, nausea. Family at bedside. 1704: Report called to unit. 1716: Dr Brady Kohler at bedside to speak with patient and family regarding procedure findings and plan of care. 1723: Assessment unchanged. Transferred to unit in stable condition.
DO SEBASTIAN [facility]ton DR GENERAL SURGERY   General Surgery Daily Progress Note    Pt Name: Isha Sanchez  Medical Record Number: [id number]  Date of Birth 1965   Today's Date: 7/25/2019  Chief complaint: feeling better  ASSESSMENT   1. Hospital day # 2   2. Cholelithiasis - suspected passed CBD stone   has a past medical history of GERD (gastroesophageal reflux disease) and Hypothyroidism. PLAN   1. Plan for minna later today  2. NPO  3. The risks, options and alternatives were discussed at length with the patient. The risks including bleeding, infection, reoperation, bile duct injury and possible conversion to an open procedure were covered as well as nonresolution of pain. The possibility of other unforeseen complications including bile leak were also mentioned. Patient was made aware of intraoperative complications such as other organ injury or injury to surrounding structures. We also discussed the conduct of the operation, probable outpatient stay postoperatively and the typical post operative recovery and restrictions. The patients questions were answered. After this discussion, the patient would like to proceed with cholecystectomy. SUBJECTIVE   Herlinda is doing better. She denies any nausea or vomiting, has not passed flatus or had a bowel movement. She is tolerating a Diet NPO Effective Now. Her pain is well controlled on current medications. She has been ambulating in the halls. CURRENT MEDICATIONS   Scheduled Meds:   piperacillin-tazobactam  3.375 g Intravenous Q8H    fluticasone  1 spray Nasal Daily    levothyroxine  125 mcg Oral Daily    pantoprazole  40 mg Intravenous Daily    sodium chloride flush  10 mL Intravenous 2 times per day     Continuous Infusions:   sodium chloride 75 mL/hr at 07/24/19 2258     PRN Meds:.sodium chloride flush, magnesium hydroxide, ondansetron  OBJECTIVE   CURRENT VITALS:  height is 5' 8\" (1.727 m) and weight is 157 lb 8 oz (71.4 kg).  Her oral temperature
Discharge teaching and instructions for diagnosis/procedure of lap choley completed with patient using teachback method. AVS reviewed. Patient given caring for your incision handout. Educated on Cholrhexadine rinse for incision care. Prescriptions given to patient. Patient voiced understanding regarding prescriptions, follow up appointments, and care of self at home. Discharged in a wheelchair to  home with support per family.
Nutrition Assessment    Type and Reason for Visit: Initial, Positive Nutrition Screen(poor appetite)    Nutrition Recommendations: Diet as per MD.     Nutrition Assessment:   Pt. nutritionally compromised AEB report of poor intake for the past week due to abdominal pain, nausea/ vomiting. At risk for further nutrition compromise r/t NPO status, CBD obstruction with associated pancreatitis. Patient declines trialing ONS when diet is advanced. Malnutrition Assessment:  · Malnutrition Status:  At risk for malnutrition  · Context: Acute illness or injury    Nutrition Risk Level: High    Nutrient Needs:  · Estimated Daily Total Kcal: 6315-4297 kcals (25-28 kcal/kgm- 72kgm 7/24)  · Estimated Daily Protein (g): 86 grams protein (1.2 grams protein/kgm- 72kgm 7/24)  · Estimated Daily Total Fluid (ml/day): per MD    Nutrition Diagnosis:   · Problem: Inadequate oral intake  · Etiology: related to Alteration in GI function     Signs and symptoms:  as evidenced by Diet history of poor intake    Objective Information:  · Nutrition-Focused Physical Findings: abdominal pain, nausea/ vomiting for the past week, reports having great appetite at this time but aware of NPO for ERCP today and timing of cholecystectomy to be determined; medication includes zosyn; elevated liver profile  · Wound Type: None  · Current Nutrition Therapies:  · Oral Diet Orders: NPO   · Anthropometric Measures:  · Ht: 5' 8\" (172.7 cm)   · Current Body Wt: 158 lb 9.6 oz (71.9 kg)  · Admission Body Wt: 158 lb 9.6 oz (71.9 kg)(7/24; no edema)  · Usual Body Wt: (~160# one week ago per patient; no weight records per EHR )  · % Weight Change:  ,  1.25% loss in 1 week; unable to confirm with EHR  · Ideal Body Wt: 140 lb (63.5 kg),   · BMI Classification: BMI 18.5 - 24.9 Normal Weight    Nutrition Interventions:   (diet as per MD)  Continued Inpatient Monitoring, Coordination of Care, Education Initiated    Nutrition Evaluation:   · Evaluation: Goals set
Pt a direct admit from [facility] to room call light in reach
Pt back from endo denies pain and nausea
Pt doing well post operatively. Had lap minna 7/25/19  Hoping to go home    Liver tests improved. , , alk phos 398, bili 2.1T/1,1D    Assessment: Cholelithiasis. Probable spontaneous passage of CBD stone. S/p cholecystectomy    Recommend: OK to discharge home per surgery. Would like to follow liver tests until normal.  Will order repeat hepatic function panel 1 month, and will see in office in 1 month                      After pt has convalesced from surgery, she will be candidate for outpatient screening colonoscopy. Signing off.
78.4  Min: 59  Max: 86  Respiration Range (24h): Resp  Av.9  Min: 1  Max: 28  Current Pulse Ox (24h):  SpO2: 95 %  Pulse Ox Range (24h):  SpO2  Av.5 %  Min: 94 %  Max: 100 %  Oxygen Amount and Delivery:    Incentive Spirometry Tx:            GENERAL: alert, no distress  LUNGS: clear to ausculation, without wheezes, rales or rhonci  HEART: normal rate and regular rhythm  ABDOMEN: soft, non-tender, non-distended, bowel sounds present in all 4 quadrants and no guarding or peritoneal signs, incisions are clean, dry and intact, covered with seri strips   EXTREMITY: no cyanosis, clubbing or edema  In: 1515 [P.O.:480; I.V.:1035]  Out: 2350 [Urine:2350]  Date 19 0000 - 19   Shift 8940-9476 1981-6813 8847-0055 24 Hour Total   INTAKE   P.O.(mL/kg/hr) 480   480   I. V.(mL/kg) 630.2(8.8)   630.2(8.8)   Shift Total(mL/kg) 1110.2(15.6)   1110.2(15.6)   OUTPUT   Urine(mL/kg/hr) 600   600   Shift Total(mL/kg) 600(8.4)   600(8.4)   Weight (kg) 71.3 71.3 71.3 71.3     LABS     Recent Labs     19  1453 19  0445   WBC 10.9*  --    HGB 12.7  --    HCT 37.5  --      --    * 136   K 4.1 3.5   CL 94* 99   CO2 25 22*   BUN 10 10   CREATININE 0.3* 0.4   MG  --  2.1   CALCIUM 9.4 9.0      Recent Labs     19  1806   INR 1.11     Recent Labs     19  0445 19  0443 19  0414   AST 72* 195* 105*   * 373* 279*   BILITOT 2.9* 4.8* 2.1*   BILIDIR 1.5* 3.4* 1.1*   LIPASE 176.1* 234.7* 177.6*     No results for input(s): TROPONINT in the last 72 hours. RADIOLOGY     FL ERCP BILIARY S&I   Final Result   Mild biliary ductal dilatation. **This report has been created using voice recognition software. It may contain minor errors which are inherent in voice recognition technology. **      Final report electronically signed by Dr. Saintclair Baston on 2019 9:34 AM        Electronically signed by Gita Llamas MD on 2019 at 7:06 AM     Care coordinated with
care.    Assessment and Plan:-anemia   1. Acute gallstone pancreatitis-lipase improving plan for ERCP today, keep n.p.o. and IV fluid   2. ?  Choledocholithiasis-GI and general surgery seen going for ERCP today-cholecystectomy per general surgery  3. History of GERD on Nexium at home will substitute with pantoprazole while in the hospital  4. History of allergic rhinitis takes Flonase at home continue    Subjective-  Feels better  No nausea no vomiting no abdominal pain  Plans for ERCP today noted  Past medical history, family history, social history and allergies reviewed again and is unchanged since admission. ROS (12 point review of systems completed. Pertinent positives noted. Otherwise ROS is negative)     Scheduled Meds:   piperacillin-tazobactam  3.375 g Intravenous Q8H    fluticasone  1 spray Nasal Daily    levothyroxine  125 mcg Oral Daily    pantoprazole  40 mg Intravenous Daily    sodium chloride flush  10 mL Intravenous 2 times per day     Continuous Infusions:   sodium chloride 75 mL/hr at 07/24/19 0351     PRN Meds:.sodium chloride flush, magnesium hydroxide, ondansetron        Diet:  Diet NPO Effective Now    REVIEW OF SYSTEMS:   Pertinent positives as noted in the HPI. All other systems reviewed and negative. PHYSICAL EXAM:  /83   Pulse 85   Temp 99.1 °F (37.3 °C) (Oral)   Resp 16   Ht 5' 8\" (1.727 m)   Wt 158 lb 9.6 oz (71.9 kg)   SpO2 96%   BMI 24.12 kg/m²   General appearance: No apparent distress, appears stated age and cooperative. HEENT: Normal cephalic, atraumatic without obvious deformity. Pupils equal, round, and reactive to light. Extra ocular muscles intact. Conjunctivae mildly icteric  Neck: Supple, with full range of motion. No jugular venous distention. Trachea midline. Respiratory:  Normal respiratory effort. Clear to auscultation, bilaterally without Rales/Wheezes/Rhonchi.   Cardiovascular: Regular rate and rhythm with normal S1/S2 without murmurs, rubs or

## 2019-08-12 NOTE — PROGRESS NOTES
Esomeprazole Magnesium (NEXIUM 24HR PO) Take 1 tablet by mouth as needed      Cetirizine HCl (ZYRTEC ALLERGY) 10 MG CAPS Take 1 tablet by mouth daily      Fluticasone Propionate (FLONASE ALLERGY RELIEF NA) 2 puffs by Nasal route as needed       No current facility-administered medications for this visit. Allergies  is allergic to aspirin and ibuprofen. Social History   reports that she has quit smoking. She has never used smokeless tobacco. She reports that she drank alcohol. She reports that she has current or past drug history. Health Screening Exams  Health Maintenance   Topic Date Due    HIV screen  02/23/1981    DTaP/Tdap/Td vaccine (1 - Tdap) 02/23/1985    Cervical cancer screen  02/23/1987    Lipid screen  02/23/2006    Breast cancer screen  02/23/2016    Shingles Vaccine (1 of 2) 02/23/2016    Colon cancer screen colonoscopy  02/23/2016    Flu vaccine (1) 09/01/2019    Pneumococcal 0-64 years Vaccine  Aged Out     Review of Systems  History obtained from the patient. Constitutional: Denies any fever, chills, fatigue. Wound: Denies any rash, skin color changes or wound problems. Resp: Denies any cough, shortness of breath. CV: Denies any chest pain, orthopnea or syncope. GI: Denies any nausea, vomiting, blood in the stool, constipation or diarrhea. Positive for incisional discomfort only. : Denies any hematuria, hesitancy or dysuria. OBJECTIVE    VITALS:  weight is 161 lb (73 kg). Her tympanic temperature is 98.5 °F (36.9 °C). Her blood pressure is 124/80 and her pulse is 86. Her respiration is 18 and oxygen saturation is 98%. Pain Score:   0 - No pain  CONSTITUTIONAL: Alert and oriented times 3, no acute distress and cooperative to examination. SKIN: Skin color, texture, turgor normal.  INCISION: wound margins intact and healing well. No signs of infection. No drainage. HEENT: no scleral icterus  ABDOMEN: soft, nondistended, no masses or organomegaly.  Bowel sounds normal. Laparoscopic scars. No sign of hematoma or seroma. Tenderness to palpation incisional only. Thank you for the interesting evaluation. Further recommendations as listed above.        Electronically signed by Arlie Leyden, DO on 8/13/2019 at 9:34 AM

## 2019-08-13 ENCOUNTER — OFFICE VISIT (OUTPATIENT)
Dept: SURGERY | Age: 54
End: 2019-08-13

## 2019-08-13 VITALS
BODY MASS INDEX: 24.48 KG/M2 | RESPIRATION RATE: 18 BRPM | SYSTOLIC BLOOD PRESSURE: 124 MMHG | TEMPERATURE: 98.5 F | WEIGHT: 161 LBS | HEART RATE: 86 BPM | OXYGEN SATURATION: 98 % | DIASTOLIC BLOOD PRESSURE: 80 MMHG

## 2019-08-13 DIAGNOSIS — Z90.49 S/P LAPAROSCOPIC CHOLECYSTECTOMY: Primary | ICD-10-CM

## 2019-08-13 PROCEDURE — 99024 POSTOP FOLLOW-UP VISIT: CPT | Performed by: SURGERY

## 2023-04-18 ENCOUNTER — NURSE ONLY (OUTPATIENT)
Dept: LAB | Age: 58
End: 2023-04-18

## 2023-04-30 LAB — CYTOLOGY THIN PREP PAP: NORMAL

## (undated) DEVICE — WARMER SCP 2 ANTIFOG LAP DISP

## (undated) DEVICE — CATHETER CHOLGM 4.5FR L18IN TIP 5.5FR W/ MTL SUPP TB TAUT

## (undated) DEVICE — BAG SPEC REM 224ML W4XL6IN DIA10MM 1 HND GYN DISP ENDOPCH

## (undated) DEVICE — DEVICE LCK BILI RAP EXCHG W/O BX CAP

## (undated) DEVICE — LINER SUCT CANSTR 1500CC SEMI RIG W/ POR HYDROPHOBIC SHUT

## (undated) DEVICE — SOLUTION IV 1000ML 0.9% SOD CHL PH 5 INJ USP VIAFLX PLAS

## (undated) DEVICE — GARMENT,MEDLINE,DVT,INT,CALF,MED, GEN2: Brand: MEDLINE

## (undated) DEVICE — APPLIER CLP L SHFT DIA12MM 20 ROT MULT LIGACLP

## (undated) DEVICE — UNIVERSAL PLUS LAPAROSCOPIC ELECTRODE WITH SUCTION/IRRIGATION, SPATULA 5 MM X 32 CM: Brand: UNIVERSAL PLUS

## (undated) DEVICE — TUBING, SUCTION, 1/4" X 20', STRAIGHT: Brand: MEDLINE INDUSTRIES, INC.

## (undated) DEVICE — TROCAR: Brand: KII FIOS FIRST ENTRY

## (undated) DEVICE — SPHINCTEROTOME: Brand: HYDRATOME RX 44

## (undated) DEVICE — Device: Brand: ENDO SMARTCAP

## (undated) DEVICE — GUIDEWIRE ENDOSCP L260CM DIA0.018IN TIP L6CM TRITON STR

## (undated) DEVICE — CANNULA ENDOSCP 5FR L210CM 0.018IN BILI ERCP 5-4-3 TIP

## (undated) DEVICE — APPLIER CLP M L L11.4IN DIA10MM ENDOSCP ROT MULT FOR LIG

## (undated) DEVICE — RETRIEVAL BALLOON CATHETER: Brand: EXTRACTOR™ PRO RX

## (undated) DEVICE — ROYAL SILK SURGICAL GOWN, XXL: Brand: CONVERTORS

## (undated) DEVICE — CONTRAST IOTHALAMATE MEGLUMINE 60% 50 ML INJ CONRAY 60

## (undated) DEVICE — CONTAINER,SPECIMEN,PNEU TUBE,4OZ,OR STRL: Brand: MEDLINE

## (undated) DEVICE — Device

## (undated) DEVICE — JELLY,LUBE,STERILE,FLIP TOP,TUBE,2-OZ: Brand: MEDLINE

## (undated) DEVICE — SUTURE ENDOLOOP SZ 0 L18IN ABSRB VLT LIG SLDE KNOT VCRL

## (undated) DEVICE — INTRODUCER CATH L3.5IN DIA7.5FR FOR CHOLANGIOGRAPHY TAUT

## (undated) DEVICE — UNIVERSAL PLUS MULTIFUNCTION INSTRUMENT SYSTEM (STRAIGHT GRIP HANDLE), STRAIGHT HANDLE ELECTROSURGICAL SUCTION/IRRIGATION INSTRUMENT WITH HAND CONTROLLED ELECTROSURGERY (BUTTONS): Brand: UNIVERSAL PLUS

## (undated) DEVICE — GOWN,SIRUS,NON REINFRCD,LARGE,SET IN SL: Brand: MEDLINE

## (undated) DEVICE — YANKAUER,BULB TIP,W/O VENT,RIGID,STERILE: Brand: MEDLINE

## (undated) DEVICE — GLOVE SURG SZ 65 THK91MIL LTX FREE SYN POLYISOPRENE